# Patient Record
Sex: MALE | Race: WHITE | NOT HISPANIC OR LATINO | Employment: FULL TIME | URBAN - METROPOLITAN AREA
[De-identification: names, ages, dates, MRNs, and addresses within clinical notes are randomized per-mention and may not be internally consistent; named-entity substitution may affect disease eponyms.]

---

## 2020-03-19 ENCOUNTER — HOSPITAL ENCOUNTER (EMERGENCY)
Facility: MEDICAL CENTER | Age: 40
End: 2020-03-20
Attending: EMERGENCY MEDICINE
Payer: COMMERCIAL

## 2020-03-19 VITALS
HEIGHT: 74 IN | OXYGEN SATURATION: 96 % | TEMPERATURE: 97 F | RESPIRATION RATE: 20 BRPM | DIASTOLIC BLOOD PRESSURE: 92 MMHG | SYSTOLIC BLOOD PRESSURE: 141 MMHG | WEIGHT: 181.88 LBS | HEART RATE: 90 BPM | BODY MASS INDEX: 23.34 KG/M2

## 2020-03-19 DIAGNOSIS — S96.929A LACERATION OF TOE WITH TENDON INVOLVEMENT, INITIAL ENCOUNTER: ICD-10-CM

## 2020-03-19 DIAGNOSIS — S92.912A CLOSED FRACTURE OF PHALANX OF TOE OF LEFT FOOT, PHYSEAL INVOLVEMENT UNSPECIFIED, UNSPECIFIED TOE, INITIAL ENCOUNTER: ICD-10-CM

## 2020-03-19 DIAGNOSIS — S91.119A LACERATION OF TOE WITH TENDON INVOLVEMENT, INITIAL ENCOUNTER: ICD-10-CM

## 2020-03-19 DIAGNOSIS — M79.675 PAIN OF TOE OF LEFT FOOT: ICD-10-CM

## 2020-03-19 PROCEDURE — 99284 EMERGENCY DEPT VISIT MOD MDM: CPT

## 2020-03-20 ENCOUNTER — APPOINTMENT (OUTPATIENT)
Dept: RADIOLOGY | Facility: MEDICAL CENTER | Age: 40
End: 2020-03-20
Attending: EMERGENCY MEDICINE
Payer: COMMERCIAL

## 2020-03-20 PROCEDURE — 700111 HCHG RX REV CODE 636 W/ 250 OVERRIDE (IP)

## 2020-03-20 PROCEDURE — 90471 IMMUNIZATION ADMIN: CPT

## 2020-03-20 PROCEDURE — A9270 NON-COVERED ITEM OR SERVICE: HCPCS | Performed by: EMERGENCY MEDICINE

## 2020-03-20 PROCEDURE — 303485 HCHG DRESSING MEDIUM

## 2020-03-20 PROCEDURE — 73630 X-RAY EXAM OF FOOT: CPT | Mod: LT

## 2020-03-20 PROCEDURE — 700102 HCHG RX REV CODE 250 W/ 637 OVERRIDE(OP): Performed by: EMERGENCY MEDICINE

## 2020-03-20 PROCEDURE — 304217 HCHG IRRIGATION SYSTEM

## 2020-03-20 PROCEDURE — 700111 HCHG RX REV CODE 636 W/ 250 OVERRIDE (IP): Performed by: EMERGENCY MEDICINE

## 2020-03-20 PROCEDURE — 303747 HCHG EXTRA SUTURE

## 2020-03-20 PROCEDURE — 304999 HCHG REPAIR-SIMPLE/INTERMED LEVEL 1

## 2020-03-20 PROCEDURE — 90715 TDAP VACCINE 7 YRS/> IM: CPT | Performed by: EMERGENCY MEDICINE

## 2020-03-20 RX ORDER — HYDROCODONE BITARTRATE AND ACETAMINOPHEN 5; 325 MG/1; MG/1
1 TABLET ORAL ONCE
Status: COMPLETED | OUTPATIENT
Start: 2020-03-20 | End: 2020-03-20

## 2020-03-20 RX ORDER — IBUPROFEN 600 MG/1
600 TABLET ORAL EVERY 6 HOURS PRN
Qty: 30 TAB | Refills: 0 | Status: SHIPPED | OUTPATIENT
Start: 2020-03-20

## 2020-03-20 RX ORDER — HYDROCODONE BITARTRATE AND ACETAMINOPHEN 5; 325 MG/1; MG/1
1 TABLET ORAL EVERY 8 HOURS PRN
Qty: 9 TAB | Refills: 0 | Status: SHIPPED | OUTPATIENT
Start: 2020-03-20 | End: 2020-03-23

## 2020-03-20 RX ORDER — BUPIVACAINE HYDROCHLORIDE 2.5 MG/ML
10 INJECTION, SOLUTION EPIDURAL; INFILTRATION; INTRACAUDAL ONCE
Status: COMPLETED | OUTPATIENT
Start: 2020-03-20 | End: 2020-03-20

## 2020-03-20 RX ORDER — BUPIVACAINE HYDROCHLORIDE 2.5 MG/ML
INJECTION, SOLUTION EPIDURAL; INFILTRATION; INTRACAUDAL
Status: COMPLETED
Start: 2020-03-20 | End: 2020-03-20

## 2020-03-20 RX ORDER — IBUPROFEN 600 MG/1
600 TABLET ORAL ONCE
Status: COMPLETED | OUTPATIENT
Start: 2020-03-20 | End: 2020-03-20

## 2020-03-20 RX ADMIN — IBUPROFEN 600 MG: 600 TABLET ORAL at 00:28

## 2020-03-20 RX ADMIN — BUPIVACAINE HYDROCHLORIDE 10 ML: 2.5 INJECTION, SOLUTION EPIDURAL; INFILTRATION; INTRACAUDAL; PERINEURAL at 00:30

## 2020-03-20 RX ADMIN — HYDROCODONE BITARTRATE AND ACETAMINOPHEN 1 TABLET: 5; 325 TABLET ORAL at 00:28

## 2020-03-20 RX ADMIN — CLOSTRIDIUM TETANI TOXOID ANTIGEN (FORMALDEHYDE INACTIVATED), CORYNEBACTERIUM DIPHTHERIAE TOXOID ANTIGEN (FORMALDEHYDE INACTIVATED), BORDETELLA PERTUSSIS TOXOID ANTIGEN (GLUTARALDEHYDE INACTIVATED), BORDETELLA PERTUSSIS FILAMENTOUS HEMAGGLUTININ ANTIGEN (FORMALDEHYDE INACTIVATED), BORDETELLA PERTUSSIS PERTACTIN ANTIGEN, AND BORDETELLA PERTUSSIS FIMBRIAE 2/3 ANTIGEN 0.5 ML: 5; 2; 2.5; 5; 3; 5 INJECTION, SUSPENSION INTRAMUSCULAR at 00:28

## 2020-03-20 RX ADMIN — BUPIVACAINE HYDROCHLORIDE 10 ML: 2.5 INJECTION, SOLUTION EPIDURAL; INFILTRATION; INTRACAUDAL at 00:30

## 2020-03-20 NOTE — ED PROVIDER NOTES
"ED Provider Note    CHIEF COMPLAINT  Chief Complaint   Patient presents with   • Foot Pain   • Toe Pain       HPI  Mr. Jasso is a 3-year-old male with no pertinent past medical history who presents emergency room for acute left-sided toe pain.  Patient states approximately 30 minutes prior to arrival he dropped a ceramic plate onto his foot and experienced a large laceration over the second and third toes.  He is water and compression and came to the emergency room.  He denies numbness or tingling and reports being able to move the toes without difficulty.  There is pain with movement and touch.  He denies any blood thinners, no prior injuries,No other acute complaints.  He is unaware of his last tetanus vaccination.    REVIEW OF SYSTEMS  No recent fevers, chills, recent illness.  No other acute skin lesions and no muscular pain or bony tenderness.  All other review of systems are negative    PAST MEDICAL HISTORY   No pertinent    SOCIAL HISTORY  Social History     Tobacco Use   • Smoking status: Current Every Day Smoker   Substance and Sexual Activity   • Alcohol use: Not on file   • Drug use: Not on file   • Sexual activity: Not on file       SURGICAL HISTORY  patient denies any surgical history    CURRENT MEDICATIONS  Home Medications    **Home medications have not yet been reviewed for this encounter**         ALLERGIES  Allergies   Allergen Reactions   • Penicillins Unspecified     Told by family as child       PHYSICAL EXAM  VITAL SIGNS: /92   Pulse 90   Temp 36.1 °C (97 °F) (Oral)   Resp 20   Ht 1.88 m (6' 2\")   Wt 82.5 kg (181 lb 14.1 oz)   SpO2 96%   BMI 23.35 kg/m²    Pulse ox interpretation: I interpret this pulse ox as normal.  Genl: M sitting in chair comfortably, speaking clearly, appears in mild distress   Head: NC/AT   ENT: Mucous membranes moist, posterior pharynx clear, uvula midline, nares patent bilaterally   Eyes: Normal sclera, pupils equal round reactive to light  Neck: Supple, " FROM, no LAD appreciated  Pulmonary: Lungs are clear to auscultation bilaterally  Chest: No TTP  CV:  RRR, no murmur appreciated  Abdomen: soft, NT/ND; no rebound/guarding  Musculoskeletal: Pain free ROM of the neck. Moving upper and lower extremities and spontaneous in coordinated fashion  Left Lower Extremity  - Skin: To ask partial-thickness lacerations noted on the superior portion of the second toe and medial distal portion of the third digit.  Neurovascularly intact, slight fibrous tissue is noted concerning for partial tendon injury.  No nailbed involvement.  No other abrasions or ecchymosis  - Motor: Full ROM at ankle; 5/5 ankle dorsal/plantar flexion, EHL/FHL  - Sensation intact to superficial/deep peroneal, tibial, saphenous, sural nerves  - 2+ dorsalis pedis and posterior tibialis, cap refill < 2 seconds x 5 digits  Neuro: A&Ox4 (person, place, time, situation), speech fluent, gait steady, no focal deficits  Skin: Skin lesions as above.  No pallor or jaundice.  No cyanosis.  Warm and dry.    COURSE & MEDICAL DECISION MAKING  DX-FOOT-COMPLETE 3+ LEFT   Final Result      1.  Fractures of the second middle phalanx and third distal phalanx.      2.  Radiopaque density projecting medial to the third distal phalanx may represent a foreign body.        Labs Reviewed - No data to display    Pertinent Labs & Imaging studies reviewed. (See chart for details)    DDX:  Laceration: r/o retained foreign body, tendon injury, vascular injury, joint injury, nerve injury,  devitalized tissue.    MDM  Patient has soft tissue lacerations to the second and third digit.  They were repaired per my note below, tetanus status is updated, and x-rays obtained to rule out foreign body and distal phalanx fractures.  X-rays do show nondisplaced phalanx fractures consistent with tuft fractures.  There is no hammer deformities or other concerning findings and on closer inspection there is not appear to be any gross wound contamination.   General laceration care is given, strict return precautions should signs of secondary infection or worsening pain persist.  Following a digital block lacks were closed and they are given very strict return precautions and discharged home in stable condition.    Procedure - Laceration closure x2  Patient was positioned appropriately, 3cc0.25% bupivicaine without epinephrine was used for digital block of 2nd/3rd toes.. 40cc NaCl was used for irrigation. Patient was sterile draped with wound exposed.   #1:4x  4.0 nylon sutures were placed with good approximation.   #2:3x  5.0 nylon sutures were placed with good approximation.  Procedure tolerated without complications. Wound dressed with bacitracin and sterile gauze.     Narcotics:  In prescribing controlled substances to this patient, I certify that I have obtained and reviewed the medical history of Sergei Reynaga. I have also made a good jake effort to obtain applicable records from other providers who have treated the patient and records did not demonstrate any increased risk of substance abuse that would prevent me from prescribing controlled substances.     I have conducted a physical exam and documented it. I have reviewed Mr. Reynaga’s prescription history as maintained by the [place] Prescription Monitoring Program.     I have assessed the patient’s risk for abuse, dependency, and addiction using the validated Opioid Risk Tool available at https://www.mdcalc.com/mqusli-fydo-elnk-ort-narcotic-abuse.     Given the above, I believe the benefits of controlled substance therapy outweigh the risks. The reasons for prescribing controlled substances include non-narcotic, oral analgesic alternatives have been inadequate for pain control. Accordingly, I have discussed the risk and benefits, treatment plan, and alternative therapies with the patient.     FINAL IMPRESSION  Visit Diagnoses     ICD-10-CM   1. Laceration of toe with tendon involvement, initial encounter  S91.119A    S96.929A   2. Pain of toe of left foot M79.675   3. Closed fracture of phalanx of toe of left foot, physeal involvement unspecified, unspecified toe, initial encounter S92.912A       Electronically signed by: Chivo Clarke M.D., 3/20/2020 12:07 AM

## 2020-03-20 NOTE — ED TRIAGE NOTES
"Chief Complaint   Patient presents with   • Foot Pain   • Toe Pain     38 yo male to triage for above complaint. Approx. 30 mins ago a ceramic plate fell out of the cabinet onto pt's L foot, 1 inch deep laceration over 2nd and 3rd toes to L foot noted, denies blood thinners, bleeding controlled, color and sensation intact, pain with movement. Unknown tetanus status.    Educated on triage process, encourage to inform staff of any changes.     /92   Pulse 90   Temp 36.1 °C (97 °F) (Oral)   Resp 20   Ht 1.88 m (6' 2\")   Wt 82.5 kg (181 lb 14.1 oz)   SpO2 96%   BMI 23.35 kg/m²   "

## 2020-03-20 NOTE — ED NOTES
Discharge orders received from [facility]. New prescriptions received motrin, norco. Provided education on discharge instructions and diagnosis.Pt demonstrated understanding of education. Pt verbalized understanding of need for follow up appointment.  Pt ambulatory off unit with all personal belongings.